# Patient Record
Sex: MALE | Race: AMERICAN INDIAN OR ALASKA NATIVE | NOT HISPANIC OR LATINO | URBAN - METROPOLITAN AREA
[De-identification: names, ages, dates, MRNs, and addresses within clinical notes are randomized per-mention and may not be internally consistent; named-entity substitution may affect disease eponyms.]

---

## 2022-04-18 ENCOUNTER — EMERGENCY (EMERGENCY)
Facility: HOSPITAL | Age: 62
LOS: 0 days | Discharge: HOME | End: 2022-04-18
Attending: EMERGENCY MEDICINE | Admitting: EMERGENCY MEDICINE
Payer: MEDICAID

## 2022-04-18 VITALS
OXYGEN SATURATION: 97 % | HEART RATE: 70 BPM | TEMPERATURE: 97 F | SYSTOLIC BLOOD PRESSURE: 132 MMHG | RESPIRATION RATE: 20 BRPM | DIASTOLIC BLOOD PRESSURE: 72 MMHG

## 2022-04-18 DIAGNOSIS — E78.5 HYPERLIPIDEMIA, UNSPECIFIED: ICD-10-CM

## 2022-04-18 DIAGNOSIS — M25.531 PAIN IN RIGHT WRIST: ICD-10-CM

## 2022-04-18 DIAGNOSIS — I10 ESSENTIAL (PRIMARY) HYPERTENSION: ICD-10-CM

## 2022-04-18 DIAGNOSIS — M25.541 PAIN IN JOINTS OF RIGHT HAND: ICD-10-CM

## 2022-04-18 DIAGNOSIS — M79.89 OTHER SPECIFIED SOFT TISSUE DISORDERS: ICD-10-CM

## 2022-04-18 DIAGNOSIS — E11.9 TYPE 2 DIABETES MELLITUS WITHOUT COMPLICATIONS: ICD-10-CM
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
## 2022-04-18 PROCEDURE — 73110 X-RAY EXAM OF WRIST: CPT | Mod: 26,RT

## 2022-04-18 PROCEDURE — 29125 APPL SHORT ARM SPLINT STATIC: CPT

## 2022-04-18 PROCEDURE — 73130 X-RAY EXAM OF HAND: CPT | Mod: 26,RT

## 2022-04-18 PROCEDURE — 99283 EMERGENCY DEPT VISIT LOW MDM: CPT | Mod: 25

## 2022-04-18 NOTE — ED PROVIDER NOTE - CARE PROVIDER_API CALL
Tray Albert)  Orthopaedic Surgery  [address] [place] [address]  Phone: [phone]  Fax: [phone]  Follow Up Time: 4-6 Days

## 2022-04-18 NOTE — ED PROVIDER NOTE - OBJECTIVE STATEMENT
60 yo male hx of htn, hld, dm presenting with mild R wrist/hand pain, worse with movement after lifting heavy groceries yesterday. no fevers, hx of gout, weakness, numbness, tingling.

## 2022-04-18 NOTE — ED PROVIDER NOTE - NS ED ROS FT
Constitutional:  see HPI  Head:  no headache, dizziness, loss of consciousness  Eyes:  no visual changes; no eye pain, redness, or discharge  ENMT:  no ear pain or discharge; no hearing problems; no mouth or throat sores or lesions; no throat pain  MS: R hand pain  Neuro: no weakness; no numbness or tingling; no seizure  Skin:  no rashes or color changes; no lacerations or abrasions

## 2022-04-18 NOTE — ED PROVIDER NOTE - CLINICAL SUMMARY MEDICAL DECISION MAKING FREE TEXT BOX
61-year-old male presents to the ED for right wrist pain worsened with movement after lifting groceries..Vitals reviewed by me noted to be wnl..  Physical exam revealed mild swelling of the right distal wrist.  Mild right scaphoid tenderness to palpation.  Full range of motion but noted to have pain on range of motion.   strength 5 out of 5 with sensation intact.  We obtained x-rays of the right wrist and right hand with no evidence of fractures or dislocation.  Due to diffuse hand pain we placed the patient in a volar splint.  Discharged home with orthopedic follow-up.

## 2022-04-18 NOTE — ED PROVIDER NOTE - PHYSICAL EXAMINATION
CONSTITUTIONAL: Well-developed; well-nourished; in no acute distress.   SKIN: warm, dry  EYES: PERRL, EOMI, normal sclera and conjunctiva   ENT: No nasal discharge; airway clear.  NECK: Supple; non tender.  CARD: S1, S2 normal; no murmurs, gallops, or rubs. Regular rate and rhythm.   RESP: No wheezes, rales or rhonchi.  EXT: mild swelling to distal R wrist, TTP R scaphoid, palm, and dorsum of hand. no erythema, rash, fluctuance. strength and sensation intact.  LYMPH: No acute cervical adenopathy.  NEURO: Alert, oriented, grossly unremarkable

## 2022-04-18 NOTE — ED PROVIDER NOTE - PATIENT PORTAL LINK FT
You can access the FollowMyHealth Patient Portal offered by [facility] by registering at the following website: [email or web address]. By joining [employer]’s FollowMyHealth portal, you will also be able to view your health information using other applications (apps) compatible with our system.

## 2022-04-18 NOTE — ED PROVIDER NOTE - NS ED ATTENDING STATEMENT MOD
This was a shared visit with the DARYL. I reviewed and verified the documentation and independently performed the documented: Attending with

## 2022-04-18 NOTE — ED PROVIDER NOTE - NSFOLLOWUPINSTRUCTIONS_ED_ALL_ED_FT
Musculoskeletal Pain    Musculoskeletal pain is muscle and tony aches and pains. These pains can occur in any part of the body. Your caregiver may treat you without knowing the cause of the pain. They may treat you if blood or urine tests, X-rays, and other tests were normal.     CAUSES  There is often not a definite cause or reason for these pains. These pains may be caused by a type of germ (virus). The discomfort may also come from overuse. Overuse includes working out too hard when your body is not fit. Tony aches also come from weather changes. Bone is sensitive to atmospheric pressure changes.    HOME CARE INSTRUCTIONS  Ask when your test results will be ready. Make sure you get your test results.  Only take over-the-counter or prescription medicines for pain, discomfort, or fever as directed by your caregiver. If you were given medications for your condition, do not drive, operate machinery or power tools, or sign legal documents for 24 hours. Do not drink alcohol. Do not take sleeping pills or other medications that may interfere with treatment.  Continue all activities unless the activities cause more pain. When the pain lessens, slowly resume normal activities. Gradually increase the intensity and duration of the activities or exercise.   During periods of severe pain, bed rest may be helpful. Lay or sit in any position that is comfortable.   Putting ice on the injured area.  Put ice in a bag.   Place a towel between your skin and the bag.  Leave the ice on for 15 to 20 minutes, 3 to 4 times a day.   Follow up with your caregiver for continued problems and no reason can be found for the pain. If the pain becomes worse or does not go away, it may be necessary to repeat tests or do additional testing. Your caregiver may need to look further for a possible cause.    SEEK IMMEDIATE MEDICAL CARE IF:  You have pain that is getting worse and is not relieved by medications.  You develop chest pain that is associated with shortness or breath, sweating, feeling sick to your stomach (nauseous), or throw up (vomit).  Your pain becomes localized to the abdomen.  You develop any new symptoms that seem different or that concern you.    MAKE SURE YOU:  Understand these instructions.   Will watch your condition.  Will get help right away if you are not doing well or get worse.    ADDITIONAL NOTES AND INSTRUCTIONS    Please follow up with your Primary MD in 24-48 hr.  Seek immediate medical care for any new/worsening signs or symptoms.

## 2025-07-02 ENCOUNTER — EMERGENCY (EMERGENCY)
Facility: HOSPITAL | Age: 65
LOS: 0 days | Discharge: HOME | End: 2025-07-02
Attending: STUDENT IN AN ORGANIZED HEALTH CARE EDUCATION/TRAINING PROGRAM
Payer: MEDICARE

## 2025-07-02 VITALS
OXYGEN SATURATION: 100 % | SYSTOLIC BLOOD PRESSURE: 95 MMHG | WEIGHT: 145.06 LBS | TEMPERATURE: 98 F | HEART RATE: 66 BPM | DIASTOLIC BLOOD PRESSURE: 64 MMHG | HEIGHT: 67 IN | RESPIRATION RATE: 20 BRPM

## 2025-07-02 VITALS — DIASTOLIC BLOOD PRESSURE: 82 MMHG | SYSTOLIC BLOOD PRESSURE: 155 MMHG

## 2025-07-02 DIAGNOSIS — M79.671 PAIN IN RIGHT FOOT: ICD-10-CM

## 2025-07-02 DIAGNOSIS — M77.31 CALCANEAL SPUR, RIGHT FOOT: ICD-10-CM

## 2025-07-02 DIAGNOSIS — E78.5 HYPERLIPIDEMIA, UNSPECIFIED: ICD-10-CM

## 2025-07-02 DIAGNOSIS — I10 ESSENTIAL (PRIMARY) HYPERTENSION: ICD-10-CM

## 2025-07-02 DIAGNOSIS — F17.200 NICOTINE DEPENDENCE, UNSPECIFIED, UNCOMPLICATED: ICD-10-CM

## 2025-07-02 DIAGNOSIS — E11.9 TYPE 2 DIABETES MELLITUS WITHOUT COMPLICATIONS: ICD-10-CM

## 2025-07-02 PROCEDURE — 99284 EMERGENCY DEPT VISIT MOD MDM: CPT | Mod: 25

## 2025-07-02 PROCEDURE — 93970 EXTREMITY STUDY: CPT

## 2025-07-02 PROCEDURE — 93970 EXTREMITY STUDY: CPT | Mod: 26

## 2025-07-02 PROCEDURE — 73630 X-RAY EXAM OF FOOT: CPT | Mod: 26,RT

## 2025-07-02 PROCEDURE — 93925 LOWER EXTREMITY STUDY: CPT

## 2025-07-02 PROCEDURE — 93925 LOWER EXTREMITY STUDY: CPT | Mod: 26

## 2025-07-02 PROCEDURE — 73630 X-RAY EXAM OF FOOT: CPT | Mod: RT

## 2025-07-02 PROCEDURE — 99284 EMERGENCY DEPT VISIT MOD MDM: CPT

## 2025-07-02 RX ORDER — IBUPROFEN 200 MG
600 TABLET ORAL ONCE
Refills: 0 | Status: COMPLETED | OUTPATIENT
Start: 2025-07-02 | End: 2025-07-02

## 2025-07-02 RX ADMIN — Medication 600 MILLIGRAM(S): at 17:47

## 2025-07-02 NOTE — ED PROVIDER NOTE - NSFOLLOWUPINSTRUCTIONS_ED_ALL_ED_FT
Our Emergency Department Referral Coordinators will be reaching out to you in the next 24-48 hours from 9:00am to 5:00pm to schedule a follow up appointment. Please expect a phone call from the hospital in that time frame. If you do not receive a call or if you have any questions or concerns, you can reach them at   [phone].      PLEASE MAKE AN APPOINTMENT WITH PODIATRY.       Heel Spur    WHAT YOU NEED TO KNOW:    What is a heel spur? A heel spur develops when calcium builds up on the underside of your heel bone. Heel spurs can be caused by inflammation or strains on your foot muscles and ligaments.  Heel, toes, ankle    What are the signs and symptoms of a heel spur? Heel spurs are often painless. If the tissue around your heel swells, you may have any of the following:    Pain on the bottom of your foot near your heel    Pain that is worse right after you get out of bed or after a long period of rest    Pain with activity    Heel swelling or warmth    A stabbing pain under your foot, often when you stand after you sit or lie for a long time  What increases my risk for a heel spur?    Anything that puts pressure on your heel, such as pregnancy or obesity    High-impact activities, such as when you run or play a sport such as tennis or basketball    A sudden increase in the intensity of an activity    Shoes that do not support your feet, such as shoes that are worn out or are too big    A medical condition that causes inflammation, such as rheumatoid arthritis  How is heel spur diagnosed? Your healthcare provider will examine your foot and ask about your activities. Your provider may check the movement of your foot and ankle. You may need an x-ray to check for a fracture or other problem.    How is heel spur treated?    Medicines may be given to decrease swelling and pain:  NSAIDs, such as ibuprofen, help decrease swelling, pain, and fever. This medicine is available with or without a doctor's order. NSAIDs can cause stomach bleeding or kidney problems in certain people. If you take blood thinner medicine, always ask your healthcare provider if NSAIDs are safe for you. Always read the medicine label and follow directions.    Acetaminophen decreases pain and fever. It is available without a doctor's order. Ask how much to take and how often to take it. Follow directions. Read the labels of all other medicines you are using to see if they also contain acetaminophen, or ask your doctor or pharmacist. Acetaminophen can cause liver damage if not taken correctly.    A steroid injection into the heel may help relieve pain and swelling. This is usually only recommended if other treatment does not work.    Devices may be used to decrease stress on the muscles and tendons in your feet. Your healthcare provider may recommend you wear a shoe insert, such as a pad or heel cup. Your provider may also show you how to tape your foot.    Physical therapy may help relieve your symptoms. A physical therapist teaches you exercises to help improve movement and strength, and to decrease pain.  How can I manage a heel spur?    Rest your injured foot. You may need to avoid putting any weight on your leg for at least 48 hours. Return to normal activities as directed.    Apply ice or heat to your heel. Ice and heat help relieve pain. Ice also helps decrease swelling. For ice, use an ice pack or put crushed ice in a plastic bag. Cover the bag with a towel before you apply it to your skin. Apply ice for 15 to 20 minutes as often as directed. For heat, use a heat pack or a heating pad on a low setting. Apply heat for 20 minutes as often as recommended. Your healthcare provider may recommend that you alternate applying ice and heat.    Stretch before you get out of bed and as directed. This loosens your muscles and tendons in your legs and feet. Ask your healthcare provider which stretches you should do and how often to do them.    Maintain a healthy weight. This will help decrease stress on your feet. Ask your healthcare provider what a healthy weight is for you. Ask your provider to help you create a weight loss plan, if needed.    Do low-impact exercises. Low-impact exercises decrease stress on your heel. Examples include swimming or bicycling. Start new activities slowly. Increase the intensity and time gradually.    Wear shoes that fit well and support your arch. Replace your shoes before the padding or shock absorption wears out. Do not walk or  bare feet or sandals for long periods of time.  When should I call my doctor?    Your pain or swelling suddenly increases.    You develop knee, hip, or back pain.    You have questions or concerns about your condition or care.  CARE AGREEMENT:    You have the right to help plan your care. Learn about your health condition and how it may be treated. Discuss treatment options with your healthcare providers to decide what care you want to receive. You always have the right to refuse treatment.

## 2025-07-02 NOTE — ED ADULT TRIAGE NOTE - TEMPERATURE IN CELSIUS (DEGREES C)
1/20/2021 Patient: Braeden Davila YOB: 1944 Date of Visit: 1/18/2021 David Bobo DO 
[address] [facility] [address] [address] [address] [address] [address] Fax: [phone] Dear David Bobo DO, Thank you for referring Ms. Santosh Jones to [address] [facility] for evaluation. My notes for this consultation are attached. If you have questions, please do not hesitate to call me. I look forward to following your patient along with you. Sincerely, Nancy Prescott MD 
 

36.6

## 2025-07-02 NOTE — ED PROVIDER NOTE - CLINICAL SUMMARY MEDICAL DECISION MAKING FREE TEXT BOX
64-year-old male with history of hypertension, hyperlipidemia, diabetes presenting today with right heel pain radiating up his foot for the last week.  No direct trauma.  No fevers.  No chest pain or shortness breath.  No abdominal pain or significant back pain.  Pain is worse with ambulating. exam as documented. xray independently interpreted by me Dr. Carlos showing heel spur. duplex prelim neg for dvt or occlusions. given webroll for cushioning. follow up with podiatry.

## 2025-07-02 NOTE — ED PROVIDER NOTE - OBJECTIVE STATEMENT
64-year-old male with history of hypertension, hyperlipidemia, diabetes presenting today with right heel pain radiating up his foot for the last week.  No direct trauma.  No fevers.  No chest pain or shortness breath.  No abdominal pain or significant back pain.  Pain is worse with ambulating.

## 2025-07-02 NOTE — ED PROVIDER NOTE - PATIENT PORTAL LINK FT
You can access the FollowMyHealth Patient Portal offered by [facility] by registering at the following website: [email or web address]. By joining [employer]’s FollowMyHealth portal, you will also be able to view your health information using other applications (apps) compatible with our system.

## 2025-07-02 NOTE — ED PROVIDER NOTE - PHYSICAL EXAMINATION
CONSTITUTIONAL: Well-developed; well-nourished; in no acute distress.   SKIN: warm, dry  HEAD: Normocephalic; atraumatic.  EYES: PERRL, EOMI, no conjunctival erythema  ENT: No nasal discharge; airway clear.  NECK: Supple; non tender.  CARD: S1, S2 normal; no murmurs, gallops, or rubs. Regular rate and rhythm.   RESP: No wheezes, rales or rhonchi.  ABD: soft ntnd  EXT: Normal ROM.  tenderness to the right heel, slightly weaker pulse to the right DP. no deformity. ambulating. no  deformity or edema to the right achilles. no tenderness to the right calf, knee, thigh.    NEURO: Alert, oriented, grossly unremarkable  PSYCH: Cooperative, appropriate.